# Patient Record
Sex: MALE | Race: WHITE | URBAN - METROPOLITAN AREA
[De-identification: names, ages, dates, MRNs, and addresses within clinical notes are randomized per-mention and may not be internally consistent; named-entity substitution may affect disease eponyms.]

---

## 2017-12-18 ENCOUNTER — APPOINTMENT (OUTPATIENT)
Dept: URBAN - METROPOLITAN AREA SURGERY 9 | Age: 37
Setting detail: DERMATOLOGY
End: 2017-12-19

## 2017-12-18 DIAGNOSIS — L73.0 ACNE KELOID: ICD-10-CM

## 2017-12-18 PROCEDURE — OTHER TREATMENT REGIMEN: OTHER

## 2017-12-18 PROCEDURE — OTHER PRESCRIPTION: OTHER

## 2017-12-18 PROCEDURE — 99213 OFFICE O/P EST LOW 20 MIN: CPT

## 2017-12-18 PROCEDURE — OTHER COUNSELING: OTHER

## 2017-12-18 RX ORDER — CLINDAMYCIN PHOSPHATE 10 MG/ML
SOLUTION TOPICAL
Qty: 1 | Refills: 5 | Status: ERX | COMMUNITY
Start: 2017-12-18

## 2017-12-18 ASSESSMENT — LOCATION ZONE DERM: LOCATION ZONE: NECK

## 2017-12-18 ASSESSMENT — LOCATION DETAILED DESCRIPTION DERM: LOCATION DETAILED: MID POSTERIOR NECK

## 2017-12-18 ASSESSMENT — LOCATION SIMPLE DESCRIPTION DERM: LOCATION SIMPLE: POSTERIOR NECK

## 2017-12-18 NOTE — PROCEDURE: TREATMENT REGIMEN
Plan: Pt to call for Rx steroid if not improving
Detail Level: Simple
Initiate Treatment: Clindamycin solution bid